# Patient Record
Sex: FEMALE | Race: WHITE
[De-identification: names, ages, dates, MRNs, and addresses within clinical notes are randomized per-mention and may not be internally consistent; named-entity substitution may affect disease eponyms.]

---

## 2019-02-07 ENCOUNTER — HOSPITAL ENCOUNTER (EMERGENCY)
Dept: HOSPITAL 74 - JER | Age: 24
LOS: 1 days | Discharge: HOME | End: 2019-02-08
Payer: COMMERCIAL

## 2019-02-07 VITALS — TEMPERATURE: 98 F | SYSTOLIC BLOOD PRESSURE: 127 MMHG | HEART RATE: 91 BPM | DIASTOLIC BLOOD PRESSURE: 77 MMHG

## 2019-02-07 VITALS — BODY MASS INDEX: 25 KG/M2
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
## 2019-02-07 DIAGNOSIS — S60.442A: Primary | ICD-10-CM

## 2019-02-08 NOTE — PDOC
History of Present Illness





<Heather Hurst - Last Filed: 02/08/19 00:45>





- General


History Source: Patient


Exam Limitations: No Limitations





- History of Present Illness


Initial Comments: 





02/08/19 01:19





The patient is a 23 year old female with no PMH who presents to the ER with a 

ring stuck on the right middle finger. Patient states she usually wears the 

ring on her ring finger but was playing with the ring and got stuck on the 

middle finger. She denies any numbness/tingling/ or pain to the ring finger. 

Patient denies any other symptoms.  











<Agata Robbins - Last Filed: 02/08/19 01:24>





- General


Chief Complaint: Edema


Stated Complaint: RING ON FINGER TO TIGHT


Time Seen by Provider: 02/08/19 00:39





Past History





- Past Medical History


COPD: No





- Suicide/Smoking/Psychosocial Hx


Smoking History: Never smoked


Hx Alcohol Use: Yes (Social)


Drug/Substance Use Hx: Yes (MJ)





<Heather Hurst - Last Filed: 02/08/19 00:45>





**Review of Systems





- Review of Systems


Able to Perform ROS?: Yes


Comments:: 





02/08/19 01:21


ADULT ROS


GENERAL/CONSTITUTIONAL: No fever or chills. No weakness.


HEAD, EYES, EARS, NOSE AND THROAT: No change in vision. No ear pain or 

discharge. No sore throat.


GASTROINTESTINAL: No nausea, vomiting, diarrhea or constipation.


GENITOURINARY: No dysuria, frequency, or change in urination.


CARDIOVASCULAR: No chest pain or shortness of breath.


RESPIRATORY: No cough, wheezing, or hemoptysis.


MUSCULOSKELETAL: No joint or muscle swelling or pain. No neck or back pain.


SKIN:  (+) Ring stuck on R middle finger. 


NEUROLOGIC: No headache, vertigo, loss of consciousness, or change in strength/

sensation.


ENDOCRINE: No increased thirst. No abnormal weight change.


HEMATOLOGIC/LYMPHATIC: No anemia, easy bleeding, or history of blood clots.


ALLERGIC/IMMUNOLOGIC: No hives or skin allergy.








<Agata Robbins - Last Filed: 02/08/19 01:24>





*Physical Exam





- Vital Signs


 Last Vital Signs











Temp Pulse Resp BP Pulse Ox


 


 98 F   91 H  20   127/77   100 


 


 02/07/19 23:53  02/07/19 23:53  02/07/19 23:53  02/07/19 23:53  02/07/19 23:53














<Heather Hurst - Last Filed: 02/08/19 00:45>





- Vital Signs


 Last Vital Signs











Temp Pulse Resp BP Pulse Ox


 


 98 F   91 H  20   127/77   100 


 


 02/07/19 23:53  02/07/19 23:53  02/07/19 23:53  02/07/19 23:53  02/07/19 23:53














- Physical Exam


Comments: 





02/08/19 01:22


ADULT PHYSICAL EXAM


Constitutional: Awake, alert, oriented.  No acute distress.


Cardiovascular:  Regular rate.  Regular rhythm. S1, S2 regular.  Distal pulses 

are 2+ and symmetric.  


Pulmonary/Chest:  No evidence of respiratory distress.  Clear to auscultation 

bilaterally  No wheezing, rales or rhonchi.


Abdominal:  Soft and non-distended.  There is no tenderness.  No rebound, 

guarding or rigidity.  No organomegaly. No palpable masses. Good bowel sounds.


Musculoskeletal:  No edema.  No cyanosis.  No clubbing.  Full range of motion 

in all extremities.  Nocalf tenderness. Radial/pedal pulses are intact and 2+ 

bilaterally


Skin:  Skin is warm and dry.  No petechiae.  No purpura. (+) Right middle 

finger mildly swollen and has a ring indent; sensation intact and has brisk cap 

refill.  


Neurological:  Alert and oriented to person, place, and time.  Cranial nerves II

-XII are grossly intact. Normal speech. Strength is grossly symmetric. No 

sensory deficits.


Psychiatric:  Good eye contact.  Normal interaction, affect and behavior.























<Agata Robbins - Last Filed: 02/08/19 01:24>





Moderate Sedation





- Procedure Monitoring


Vital Signs: 


Procedure Monitoring Vital Signs











Temperature  98 F   02/07/19 23:53


 


Pulse Rate  91 H  02/07/19 23:53


 


Respiratory Rate  20   02/07/19 23:53


 


Blood Pressure  127/77   02/07/19 23:53


 


O2 Sat by Pulse Oximetry (%)  100   02/07/19 23:53











<Heather Hurst - Last Filed: 02/08/19 00:45>





- Procedure Monitoring


Vital Signs: 


Procedure Monitoring Vital Signs











Temperature  98 F   02/07/19 23:53


 


Pulse Rate  91 H  02/07/19 23:53


 


Respiratory Rate  20   02/07/19 23:53


 


Blood Pressure  127/77   02/07/19 23:53


 


O2 Sat by Pulse Oximetry (%)  100   02/07/19 23:53











<Agata Robbins - Last Filed: 02/08/19 01:24>





Medical Decision Making





- Medical Decision Making





02/08/19 00:45


22yo female with a ring stuck on her middle finger of r hand


-ring was cut off by the nurse prior to eval


-was playing with the ring and it got stuck on the middle finger, usually wears 

it on the ring finger


-pt neurovasc intact


-stable for dc to home





<Heather Hurst - Last Filed: 02/08/19 00:45>





*DC/Admit/Observation/Transfer





- Discharge Dispostion


Decision to Admit order: No





- Attestations


Physician Attestion: 





02/08/19 00:49








I, Dr. Heather Hurst DO, attest that this document has been prepared under 

my direction and personally reviewed by me in its entirety.   I further attest, 

that it accurately reflects all work, treatment, procedures and medical decision

-making performed by me.  





<Heather Hurst - Last Filed: 02/08/19 00:45>





- Attestations


Scribe Attestion: 





02/08/19 01:24





Documentation prepared by Agata Robbins, acting as medical scribe for Heather Hurst DO.





<Agata Robbins - Last Filed: 02/08/19 01:24>


Diagnosis at time of Disposition: 


 Swelling of finger, Constrictive jewelry of finger








- Discharge Dispostion


Disposition: HOME


Condition at time of disposition: Stable





- Referrals


Referrals: 


Leonides Nguyen MD [Staff Physician] - 





- Patient Instructions


Additional Instructions: 


Please apply ice as needed to the finger. Please keep it elevated. The ring was 

removed from the finger while you were in the ED. Please do not wear jewelry to 

the hand while the finger is still mildly swollen. Please follow up with your 

PMD as needed. Please return to the ED with any further concerns or complaints.

## 2022-11-21 ENCOUNTER — APPOINTMENT (OUTPATIENT)
Dept: ANTEPARTUM | Facility: CLINIC | Age: 27
End: 2022-11-21

## 2022-11-21 ENCOUNTER — ASOB RESULT (OUTPATIENT)
Age: 27
End: 2022-11-21

## 2022-11-21 PROCEDURE — 76801 OB US < 14 WKS SINGLE FETUS: CPT | Mod: NC

## 2022-11-21 PROCEDURE — 93976 VASCULAR STUDY: CPT

## 2022-11-21 PROCEDURE — 76813 OB US NUCHAL MEAS 1 GEST: CPT

## 2023-01-17 ENCOUNTER — ASOB RESULT (OUTPATIENT)
Age: 28
End: 2023-01-17

## 2023-01-17 ENCOUNTER — APPOINTMENT (OUTPATIENT)
Dept: ANTEPARTUM | Facility: CLINIC | Age: 28
End: 2023-01-17
Payer: MEDICAID

## 2023-01-17 PROCEDURE — 76805 OB US >/= 14 WKS SNGL FETUS: CPT

## 2023-01-31 ENCOUNTER — APPOINTMENT (OUTPATIENT)
Dept: ANTEPARTUM | Facility: CLINIC | Age: 28
End: 2023-01-31

## 2023-02-20 PROCEDURE — PSEU9049 QUANTIFERON TB PLUS: Performed by: CLINICAL MEDICAL LABORATORY

## 2023-02-20 PROCEDURE — 86480 TB TEST CELL IMMUN MEASURE: CPT | Performed by: CLINICAL MEDICAL LABORATORY

## 2023-02-21 ENCOUNTER — LAB REQUISITION (OUTPATIENT)
Dept: LAB | Age: 28
End: 2023-02-21

## 2023-02-21 DIAGNOSIS — Z13.9 ENCOUNTER FOR SCREENING, UNSPECIFIED: ICD-10-CM

## 2023-02-22 LAB
GAMMA INTERFERON BACKGROUND BLD IA-ACNC: 0.03 IU/ML
M TB IFN-G BLD-IMP: NEGATIVE
M TB IFN-G CD4+ BCKGRND COR BLD-ACNC: 0 IU/ML
M TB IFN-G CD4+CD8+ BCKGRND COR BLD-ACNC: 0.01 IU/ML
MITOGEN IGNF BCKGRD COR BLD-ACNC: >10 IU/ML

## 2023-03-24 ENCOUNTER — LAB REQUISITION (OUTPATIENT)
Dept: LAB | Age: 28
End: 2023-03-24

## 2023-03-24 DIAGNOSIS — Z34.00 ENCOUNTER FOR SUPERVISION OF NORMAL FIRST PREGNANCY, UNSPECIFIED TRIMESTER: ICD-10-CM

## 2023-03-24 PROCEDURE — PSEU9964 TRICHOMONAS VAGINALIS BY NUCLEIC ACID AMPLIFICATION: Performed by: CLINICAL MEDICAL LABORATORY

## 2023-03-24 PROCEDURE — 87491 CHLMYD TRACH DNA AMP PROBE: CPT | Performed by: CLINICAL MEDICAL LABORATORY

## 2023-03-24 PROCEDURE — PSEU9913 CHLAMYDIA/GONORRHEA BY NUCLEIC ACID AMPLIFICATION: Performed by: CLINICAL MEDICAL LABORATORY

## 2023-03-24 PROCEDURE — 87661 TRICHOMONAS VAGINALIS AMPLIF: CPT | Performed by: CLINICAL MEDICAL LABORATORY

## 2023-03-24 PROCEDURE — 87591 N.GONORRHOEAE DNA AMP PROB: CPT | Performed by: CLINICAL MEDICAL LABORATORY

## 2023-03-27 LAB
C TRACH RRNA SPEC QL NAA+PROBE: NEGATIVE
Lab: NORMAL
N GONORRHOEA RRNA SPEC QL NAA+PROBE: NEGATIVE
T VAGINALIS RRNA VAG QL NAA+PROBE: NEGATIVE

## 2023-05-16 ENCOUNTER — APPOINTMENT (OUTPATIENT)
Dept: ANTEPARTUM | Facility: CLINIC | Age: 28
End: 2023-05-16
Payer: MEDICAID

## 2023-05-16 ENCOUNTER — ASOB RESULT (OUTPATIENT)
Age: 28
End: 2023-05-16

## 2023-05-16 PROCEDURE — 76818 FETAL BIOPHYS PROFILE W/NST: CPT

## 2023-05-16 PROCEDURE — 76816 OB US FOLLOW-UP PER FETUS: CPT

## 2023-06-03 ENCOUNTER — TRANSCRIPTION ENCOUNTER (OUTPATIENT)
Age: 28
End: 2023-06-03

## 2023-06-04 ENCOUNTER — INPATIENT (INPATIENT)
Facility: HOSPITAL | Age: 28
LOS: 3 days | Discharge: HOME | End: 2023-06-08
Attending: OBSTETRICS & GYNECOLOGY | Admitting: OBSTETRICS & GYNECOLOGY
Payer: MEDICAID

## 2023-06-04 VITALS — WEIGHT: 167.99 LBS | HEIGHT: 62 IN

## 2023-06-04 DIAGNOSIS — O26.899 OTHER SPECIFIED PREGNANCY RELATED CONDITIONS, UNSPECIFIED TRIMESTER: ICD-10-CM

## 2023-06-04 DIAGNOSIS — Z3A.00 WEEKS OF GESTATION OF PREGNANCY NOT SPECIFIED: ICD-10-CM

## 2023-06-04 LAB
BASOPHILS # BLD AUTO: 0.03 K/UL — SIGNIFICANT CHANGE UP (ref 0–0.2)
BASOPHILS NFR BLD AUTO: 0.3 % — SIGNIFICANT CHANGE UP (ref 0–2)
BLD GP AB SCN SERPL QL: NEGATIVE — SIGNIFICANT CHANGE UP
EOSINOPHIL # BLD AUTO: 0.06 K/UL — SIGNIFICANT CHANGE UP (ref 0–0.5)
EOSINOPHIL NFR BLD AUTO: 0.7 % — SIGNIFICANT CHANGE UP (ref 0–6)
HCT VFR BLD CALC: 36.6 % — SIGNIFICANT CHANGE UP (ref 34.5–45)
HGB BLD-MCNC: 12.4 G/DL — SIGNIFICANT CHANGE UP (ref 11.5–15.5)
IMM GRANULOCYTES NFR BLD AUTO: 0.5 % — SIGNIFICANT CHANGE UP (ref 0–0.9)
LYMPHOCYTES # BLD AUTO: 2.1 K/UL — SIGNIFICANT CHANGE UP (ref 1–3.3)
LYMPHOCYTES # BLD AUTO: 24.1 % — SIGNIFICANT CHANGE UP (ref 13–44)
MCHC RBC-ENTMCNC: 30 PG — SIGNIFICANT CHANGE UP (ref 27–34)
MCHC RBC-ENTMCNC: 33.9 GM/DL — SIGNIFICANT CHANGE UP (ref 32–36)
MCV RBC AUTO: 88.6 FL — SIGNIFICANT CHANGE UP (ref 80–100)
MONOCYTES # BLD AUTO: 0.73 K/UL — SIGNIFICANT CHANGE UP (ref 0–0.9)
MONOCYTES NFR BLD AUTO: 8.4 % — SIGNIFICANT CHANGE UP (ref 2–14)
NEUTROPHILS # BLD AUTO: 5.74 K/UL — SIGNIFICANT CHANGE UP (ref 1.8–7.4)
NEUTROPHILS NFR BLD AUTO: 66 % — SIGNIFICANT CHANGE UP (ref 43–77)
NRBC # BLD: 0 /100 WBCS — SIGNIFICANT CHANGE UP (ref 0–0)
PLATELET # BLD AUTO: 275 K/UL — SIGNIFICANT CHANGE UP (ref 150–400)
RBC # BLD: 4.13 M/UL — SIGNIFICANT CHANGE UP (ref 3.8–5.2)
RBC # FLD: 13.2 % — SIGNIFICANT CHANGE UP (ref 10.3–14.5)
RH IG SCN BLD-IMP: POSITIVE — SIGNIFICANT CHANGE UP
RH IG SCN BLD-IMP: POSITIVE — SIGNIFICANT CHANGE UP
WBC # BLD: 8.7 K/UL — SIGNIFICANT CHANGE UP (ref 3.8–10.5)
WBC # FLD AUTO: 8.7 K/UL — SIGNIFICANT CHANGE UP (ref 3.8–10.5)

## 2023-06-04 RX ORDER — CITRIC ACID/SODIUM CITRATE 300-500 MG
15 SOLUTION, ORAL ORAL EVERY 6 HOURS
Refills: 0 | Status: DISCONTINUED | OUTPATIENT
Start: 2023-06-04 | End: 2023-06-05

## 2023-06-04 RX ORDER — OXYTOCIN 10 UNIT/ML
333.33 VIAL (ML) INJECTION
Qty: 20 | Refills: 0 | Status: DISCONTINUED | OUTPATIENT
Start: 2023-06-04 | End: 2023-06-05

## 2023-06-04 RX ORDER — CHLORHEXIDINE GLUCONATE 213 G/1000ML
1 SOLUTION TOPICAL DAILY
Refills: 0 | Status: DISCONTINUED | OUTPATIENT
Start: 2023-06-04 | End: 2023-06-05

## 2023-06-04 RX ORDER — SODIUM CHLORIDE 9 MG/ML
1000 INJECTION, SOLUTION INTRAVENOUS
Refills: 0 | Status: DISCONTINUED | OUTPATIENT
Start: 2023-06-04 | End: 2023-06-05

## 2023-06-04 RX ORDER — OXYTOCIN 10 UNIT/ML
2 VIAL (ML) INJECTION
Qty: 30 | Refills: 0 | Status: DISCONTINUED | OUTPATIENT
Start: 2023-06-04 | End: 2023-06-08

## 2023-06-04 RX ORDER — FENTANYL/BUPIVACAINE/NS/PF 2MCG/ML-.1
250 PLASTIC BAG, INJECTION (ML) INJECTION
Refills: 0 | Status: DISCONTINUED | OUTPATIENT
Start: 2023-06-04 | End: 2023-06-08

## 2023-06-04 RX ADMIN — SODIUM CHLORIDE 125 MILLILITER(S): 9 INJECTION, SOLUTION INTRAVENOUS at 19:18

## 2023-06-04 RX ADMIN — Medication 2 MILLIUNIT(S)/MIN: at 21:54

## 2023-06-05 LAB
COVID-19 SPIKE DOMAIN AB INTERP: POSITIVE
COVID-19 SPIKE DOMAIN ANTIBODY RESULT: >250 U/ML — HIGH
SARS-COV-2 IGG+IGM SERPL QL IA: >250 U/ML — HIGH
SARS-COV-2 IGG+IGM SERPL QL IA: POSITIVE
T PALLIDUM AB TITR SER: NEGATIVE — SIGNIFICANT CHANGE UP

## 2023-06-05 PROCEDURE — 88307 TISSUE EXAM BY PATHOLOGIST: CPT | Mod: 26

## 2023-06-05 DEVICE — SURGICEL FIBRILLAR 2 X 4": Type: IMPLANTABLE DEVICE | Status: FUNCTIONAL

## 2023-06-05 RX ORDER — ENOXAPARIN SODIUM 100 MG/ML
40 INJECTION SUBCUTANEOUS EVERY 24 HOURS
Refills: 0 | Status: DISCONTINUED | OUTPATIENT
Start: 2023-06-06 | End: 2023-06-08

## 2023-06-05 RX ORDER — IBUPROFEN 200 MG
600 TABLET ORAL EVERY 6 HOURS
Refills: 0 | Status: COMPLETED | OUTPATIENT
Start: 2023-06-05 | End: 2024-05-03

## 2023-06-05 RX ORDER — OXYCODONE HYDROCHLORIDE 5 MG/1
5 TABLET ORAL
Refills: 0 | Status: DISCONTINUED | OUTPATIENT
Start: 2023-06-05 | End: 2023-06-08

## 2023-06-05 RX ORDER — KETOROLAC TROMETHAMINE 30 MG/ML
30 SYRINGE (ML) INJECTION ONCE
Refills: 0 | Status: DISCONTINUED | OUTPATIENT
Start: 2023-06-05 | End: 2023-06-05

## 2023-06-05 RX ORDER — ACETAMINOPHEN 500 MG
1000 TABLET ORAL ONCE
Refills: 0 | Status: COMPLETED | OUTPATIENT
Start: 2023-06-05 | End: 2023-06-05

## 2023-06-05 RX ORDER — SIMETHICONE 80 MG/1
80 TABLET, CHEWABLE ORAL EVERY 4 HOURS
Refills: 0 | Status: DISCONTINUED | OUTPATIENT
Start: 2023-06-05 | End: 2023-06-08

## 2023-06-05 RX ORDER — AZITHROMYCIN 500 MG/1
500 TABLET, FILM COATED ORAL ONCE
Refills: 0 | Status: COMPLETED | OUTPATIENT
Start: 2023-06-05 | End: 2023-06-05

## 2023-06-05 RX ORDER — SODIUM CHLORIDE 9 MG/ML
1000 INJECTION, SOLUTION INTRAVENOUS
Refills: 0 | Status: DISCONTINUED | OUTPATIENT
Start: 2023-06-05 | End: 2023-06-08

## 2023-06-05 RX ORDER — DIPHENHYDRAMINE HCL 50 MG
25 CAPSULE ORAL EVERY 6 HOURS
Refills: 0 | Status: COMPLETED | OUTPATIENT
Start: 2023-06-05 | End: 2024-05-03

## 2023-06-05 RX ORDER — TETANUS TOXOID, REDUCED DIPHTHERIA TOXOID AND ACELLULAR PERTUSSIS VACCINE, ADSORBED 5; 2.5; 8; 8; 2.5 [IU]/.5ML; [IU]/.5ML; UG/.5ML; UG/.5ML; UG/.5ML
0.5 SUSPENSION INTRAMUSCULAR ONCE
Refills: 0 | Status: DISCONTINUED | OUTPATIENT
Start: 2023-06-05 | End: 2023-06-08

## 2023-06-05 RX ORDER — ACETAMINOPHEN 500 MG
975 TABLET ORAL
Refills: 0 | Status: DISCONTINUED | OUTPATIENT
Start: 2023-06-05 | End: 2023-06-08

## 2023-06-05 RX ORDER — LANOLIN
1 OINTMENT (GRAM) TOPICAL EVERY 6 HOURS
Refills: 0 | Status: DISCONTINUED | OUTPATIENT
Start: 2023-06-05 | End: 2023-06-08

## 2023-06-05 RX ORDER — MAGNESIUM HYDROXIDE 400 MG/1
30 TABLET, CHEWABLE ORAL
Refills: 0 | Status: DISCONTINUED | OUTPATIENT
Start: 2023-06-05 | End: 2023-06-08

## 2023-06-05 RX ORDER — OXYCODONE HYDROCHLORIDE 5 MG/1
5 TABLET ORAL
Refills: 0 | Status: COMPLETED | OUTPATIENT
Start: 2023-06-05 | End: 2023-06-12

## 2023-06-05 RX ORDER — ONDANSETRON 8 MG/1
8 TABLET, FILM COATED ORAL EVERY 6 HOURS
Refills: 0 | Status: DISCONTINUED | OUTPATIENT
Start: 2023-06-05 | End: 2023-06-08

## 2023-06-05 RX ORDER — IBUPROFEN 200 MG
600 TABLET ORAL EVERY 6 HOURS
Refills: 0 | Status: DISCONTINUED | OUTPATIENT
Start: 2023-06-05 | End: 2023-06-08

## 2023-06-05 RX ORDER — OXYCODONE HYDROCHLORIDE 5 MG/1
5 TABLET ORAL ONCE
Refills: 0 | Status: DISCONTINUED | OUTPATIENT
Start: 2023-06-05 | End: 2023-06-08

## 2023-06-05 RX ORDER — OXYTOCIN 10 UNIT/ML
333.33 VIAL (ML) INJECTION
Qty: 20 | Refills: 0 | Status: DISCONTINUED | OUTPATIENT
Start: 2023-06-05 | End: 2023-06-08

## 2023-06-05 RX ORDER — CEFAZOLIN SODIUM 1 G
2000 VIAL (EA) INJECTION ONCE
Refills: 0 | Status: COMPLETED | OUTPATIENT
Start: 2023-06-05 | End: 2023-06-05

## 2023-06-05 RX ADMIN — Medication 100 MILLIGRAM(S): at 12:55

## 2023-06-05 RX ADMIN — Medication 975 MILLIGRAM(S): at 22:47

## 2023-06-05 RX ADMIN — AZITHROMYCIN 255 MILLIGRAM(S): 500 TABLET, FILM COATED ORAL at 13:10

## 2023-06-05 RX ADMIN — SODIUM CHLORIDE 125 MILLILITER(S): 9 INJECTION, SOLUTION INTRAVENOUS at 10:00

## 2023-06-05 RX ADMIN — Medication 400 MILLIGRAM(S): at 15:54

## 2023-06-05 RX ADMIN — Medication 975 MILLIGRAM(S): at 21:29

## 2023-06-05 RX ADMIN — Medication 600 MILLIGRAM(S): at 23:59

## 2023-06-05 RX ADMIN — Medication 30 MILLIGRAM(S): at 19:05

## 2023-06-05 RX ADMIN — OXYCODONE HYDROCHLORIDE 5 MILLIGRAM(S): 5 TABLET ORAL at 18:00

## 2023-06-05 RX ADMIN — Medication 30 MILLIGRAM(S): at 18:07

## 2023-06-05 RX ADMIN — OXYCODONE HYDROCHLORIDE 5 MILLIGRAM(S): 5 TABLET ORAL at 17:06

## 2023-06-06 ENCOUNTER — APPOINTMENT (OUTPATIENT)
Dept: ANTEPARTUM | Facility: CLINIC | Age: 28
End: 2023-06-06

## 2023-06-06 LAB
ANISOCYTOSIS BLD QL: SLIGHT — SIGNIFICANT CHANGE UP
BASOPHILS # BLD AUTO: 0.19 K/UL — SIGNIFICANT CHANGE UP (ref 0–0.2)
BASOPHILS NFR BLD AUTO: 0.9 % — SIGNIFICANT CHANGE UP (ref 0–2)
EOSINOPHIL # BLD AUTO: 0 K/UL — SIGNIFICANT CHANGE UP (ref 0–0.5)
EOSINOPHIL NFR BLD AUTO: 0 % — SIGNIFICANT CHANGE UP (ref 0–6)
GIANT PLATELETS BLD QL SMEAR: PRESENT — SIGNIFICANT CHANGE UP
HCT VFR BLD CALC: 27.9 % — LOW (ref 34.5–45)
HGB BLD-MCNC: 9.2 G/DL — LOW (ref 11.5–15.5)
LYMPHOCYTES # BLD AUTO: 2.12 K/UL — SIGNIFICANT CHANGE UP (ref 1–3.3)
LYMPHOCYTES # BLD AUTO: 9.9 % — LOW (ref 13–44)
MACROCYTES BLD QL: SLIGHT — SIGNIFICANT CHANGE UP
MANUAL SMEAR VERIFICATION: SIGNIFICANT CHANGE UP
MCHC RBC-ENTMCNC: 30 PG — SIGNIFICANT CHANGE UP (ref 27–34)
MCHC RBC-ENTMCNC: 33 GM/DL — SIGNIFICANT CHANGE UP (ref 32–36)
MCV RBC AUTO: 90.9 FL — SIGNIFICANT CHANGE UP (ref 80–100)
MONOCYTES # BLD AUTO: 0.58 K/UL — SIGNIFICANT CHANGE UP (ref 0–0.9)
MONOCYTES NFR BLD AUTO: 2.7 % — SIGNIFICANT CHANGE UP (ref 2–14)
NEUTROPHILS # BLD AUTO: 18.12 K/UL — HIGH (ref 1.8–7.4)
NEUTROPHILS NFR BLD AUTO: 83.8 % — HIGH (ref 43–77)
NEUTS BAND # BLD: 0.9 % — SIGNIFICANT CHANGE UP (ref 0–8)
OVALOCYTES BLD QL SMEAR: SLIGHT — SIGNIFICANT CHANGE UP
PLAT MORPH BLD: ABNORMAL
PLATELET # BLD AUTO: 200 K/UL — SIGNIFICANT CHANGE UP (ref 150–400)
POLYCHROMASIA BLD QL SMEAR: SLIGHT — SIGNIFICANT CHANGE UP
RBC # BLD: 3.07 M/UL — LOW (ref 3.8–5.2)
RBC # FLD: 13.4 % — SIGNIFICANT CHANGE UP (ref 10.3–14.5)
RBC BLD AUTO: ABNORMAL
SMUDGE CELLS # BLD: PRESENT — SIGNIFICANT CHANGE UP
VARIANT LYMPHS # BLD: 1.8 % — SIGNIFICANT CHANGE UP (ref 0–6)
WBC # BLD: 21.39 K/UL — HIGH (ref 3.8–10.5)
WBC # FLD AUTO: 21.39 K/UL — HIGH (ref 3.8–10.5)

## 2023-06-06 RX ORDER — DIPHENHYDRAMINE HCL 50 MG
25 CAPSULE ORAL EVERY 6 HOURS
Refills: 0 | Status: DISCONTINUED | OUTPATIENT
Start: 2023-06-06 | End: 2023-06-08

## 2023-06-06 RX ADMIN — MAGNESIUM HYDROXIDE 30 MILLILITER(S): 400 TABLET, CHEWABLE ORAL at 22:26

## 2023-06-06 RX ADMIN — Medication 975 MILLIGRAM(S): at 09:24

## 2023-06-06 RX ADMIN — OXYCODONE HYDROCHLORIDE 5 MILLIGRAM(S): 5 TABLET ORAL at 22:08

## 2023-06-06 RX ADMIN — OXYCODONE HYDROCHLORIDE 5 MILLIGRAM(S): 5 TABLET ORAL at 11:16

## 2023-06-06 RX ADMIN — Medication 1 APPLICATION(S): at 12:45

## 2023-06-06 RX ADMIN — Medication 975 MILLIGRAM(S): at 15:39

## 2023-06-06 RX ADMIN — ENOXAPARIN SODIUM 40 MILLIGRAM(S): 100 INJECTION SUBCUTANEOUS at 06:04

## 2023-06-06 RX ADMIN — Medication 600 MILLIGRAM(S): at 23:39

## 2023-06-06 RX ADMIN — Medication 25 MILLIGRAM(S): at 03:13

## 2023-06-06 RX ADMIN — SIMETHICONE 80 MILLIGRAM(S): 80 TABLET, CHEWABLE ORAL at 22:26

## 2023-06-06 RX ADMIN — OXYCODONE HYDROCHLORIDE 5 MILLIGRAM(S): 5 TABLET ORAL at 12:16

## 2023-06-06 RX ADMIN — OXYCODONE HYDROCHLORIDE 5 MILLIGRAM(S): 5 TABLET ORAL at 21:10

## 2023-06-06 RX ADMIN — Medication 975 MILLIGRAM(S): at 22:08

## 2023-06-06 RX ADMIN — Medication 975 MILLIGRAM(S): at 10:20

## 2023-06-06 RX ADMIN — OXYCODONE HYDROCHLORIDE 5 MILLIGRAM(S): 5 TABLET ORAL at 18:39

## 2023-06-06 RX ADMIN — Medication 600 MILLIGRAM(S): at 12:46

## 2023-06-06 RX ADMIN — Medication 600 MILLIGRAM(S): at 06:04

## 2023-06-06 RX ADMIN — Medication 975 MILLIGRAM(S): at 02:56

## 2023-06-06 RX ADMIN — OXYCODONE HYDROCHLORIDE 5 MILLIGRAM(S): 5 TABLET ORAL at 19:30

## 2023-06-06 RX ADMIN — Medication 975 MILLIGRAM(S): at 21:08

## 2023-06-06 NOTE — LACTATION INITIAL EVALUATION - NS LACT CON REASON FOR REQ
Dyad seen at 11hrs post birth. Demonstrate hand expressing colostrum, bilateral expressible colostrum present. Place infant on Rt breast with football hold, infant latched with widely open flanged mouth and sucking sustained without nipple pain. Discuss how to maintain and increase breast milk supply and benefits of exclusively breastfeeding for mom and infant. Mom comfortable with latching and positioning. Discuss consult with primary RN/primaparous mom

## 2023-06-07 RX ORDER — KETOROLAC TROMETHAMINE 30 MG/ML
30 SYRINGE (ML) INJECTION EVERY 6 HOURS
Refills: 0 | Status: DISCONTINUED | OUTPATIENT
Start: 2023-06-07 | End: 2023-06-08

## 2023-06-07 RX ADMIN — Medication 975 MILLIGRAM(S): at 08:40

## 2023-06-07 RX ADMIN — OXYCODONE HYDROCHLORIDE 5 MILLIGRAM(S): 5 TABLET ORAL at 08:00

## 2023-06-07 RX ADMIN — OXYCODONE HYDROCHLORIDE 5 MILLIGRAM(S): 5 TABLET ORAL at 11:03

## 2023-06-07 RX ADMIN — ENOXAPARIN SODIUM 40 MILLIGRAM(S): 100 INJECTION SUBCUTANEOUS at 06:49

## 2023-06-07 RX ADMIN — OXYCODONE HYDROCHLORIDE 5 MILLIGRAM(S): 5 TABLET ORAL at 07:04

## 2023-06-07 RX ADMIN — Medication 975 MILLIGRAM(S): at 15:14

## 2023-06-07 RX ADMIN — Medication 975 MILLIGRAM(S): at 16:30

## 2023-06-07 RX ADMIN — Medication 975 MILLIGRAM(S): at 03:19

## 2023-06-07 RX ADMIN — Medication 30 MILLIGRAM(S): at 12:30

## 2023-06-07 RX ADMIN — Medication 975 MILLIGRAM(S): at 04:00

## 2023-06-07 RX ADMIN — Medication 600 MILLIGRAM(S): at 00:40

## 2023-06-07 RX ADMIN — OXYCODONE HYDROCHLORIDE 5 MILLIGRAM(S): 5 TABLET ORAL at 00:38

## 2023-06-07 RX ADMIN — Medication 30 MILLIGRAM(S): at 19:00

## 2023-06-07 RX ADMIN — Medication 30 MILLIGRAM(S): at 18:13

## 2023-06-07 RX ADMIN — Medication 1 APPLICATION(S): at 07:05

## 2023-06-07 RX ADMIN — Medication 600 MILLIGRAM(S): at 07:26

## 2023-06-07 RX ADMIN — Medication 975 MILLIGRAM(S): at 22:00

## 2023-06-07 RX ADMIN — OXYCODONE HYDROCHLORIDE 5 MILLIGRAM(S): 5 TABLET ORAL at 01:00

## 2023-06-07 RX ADMIN — Medication 30 MILLIGRAM(S): at 11:36

## 2023-06-07 RX ADMIN — Medication 975 MILLIGRAM(S): at 21:06

## 2023-06-07 RX ADMIN — OXYCODONE HYDROCHLORIDE 5 MILLIGRAM(S): 5 TABLET ORAL at 10:13

## 2023-06-07 RX ADMIN — Medication 600 MILLIGRAM(S): at 06:48

## 2023-06-07 RX ADMIN — Medication 975 MILLIGRAM(S): at 09:30

## 2023-06-08 ENCOUNTER — TRANSCRIPTION ENCOUNTER (OUTPATIENT)
Age: 28
End: 2023-06-08

## 2023-06-08 VITALS
OXYGEN SATURATION: 97 % | DIASTOLIC BLOOD PRESSURE: 80 MMHG | HEART RATE: 90 BPM | SYSTOLIC BLOOD PRESSURE: 123 MMHG | TEMPERATURE: 99 F

## 2023-06-08 PROCEDURE — 86900 BLOOD TYPING SEROLOGIC ABO: CPT

## 2023-06-08 PROCEDURE — 86769 SARS-COV-2 COVID-19 ANTIBODY: CPT

## 2023-06-08 PROCEDURE — 36415 COLL VENOUS BLD VENIPUNCTURE: CPT

## 2023-06-08 PROCEDURE — C1889: CPT

## 2023-06-08 PROCEDURE — 99214 OFFICE O/P EST MOD 30 MIN: CPT

## 2023-06-08 PROCEDURE — 86780 TREPONEMA PALLIDUM: CPT

## 2023-06-08 PROCEDURE — 86901 BLOOD TYPING SEROLOGIC RH(D): CPT

## 2023-06-08 PROCEDURE — 85025 COMPLETE CBC W/AUTO DIFF WBC: CPT

## 2023-06-08 PROCEDURE — 88307 TISSUE EXAM BY PATHOLOGIST: CPT

## 2023-06-08 PROCEDURE — 59050 FETAL MONITOR W/REPORT: CPT

## 2023-06-08 PROCEDURE — 86850 RBC ANTIBODY SCREEN: CPT

## 2023-06-08 RX ORDER — IBUPROFEN 200 MG
1 TABLET ORAL
Qty: 0 | Refills: 0 | DISCHARGE
Start: 2023-06-08

## 2023-06-08 RX ORDER — ACETAMINOPHEN 500 MG
3 TABLET ORAL
Qty: 0 | Refills: 0 | DISCHARGE
Start: 2023-06-08

## 2023-06-08 RX ADMIN — Medication 600 MILLIGRAM(S): at 12:07

## 2023-06-08 RX ADMIN — Medication 600 MILLIGRAM(S): at 06:22

## 2023-06-08 RX ADMIN — Medication 975 MILLIGRAM(S): at 08:35

## 2023-06-08 RX ADMIN — Medication 975 MILLIGRAM(S): at 08:04

## 2023-06-08 RX ADMIN — Medication 975 MILLIGRAM(S): at 03:05

## 2023-06-08 RX ADMIN — Medication 30 MILLIGRAM(S): at 00:16

## 2023-06-08 RX ADMIN — SIMETHICONE 80 MILLIGRAM(S): 80 TABLET, CHEWABLE ORAL at 12:26

## 2023-06-08 RX ADMIN — ENOXAPARIN SODIUM 40 MILLIGRAM(S): 100 INJECTION SUBCUTANEOUS at 06:22

## 2023-06-08 RX ADMIN — Medication 600 MILLIGRAM(S): at 07:10

## 2023-06-08 RX ADMIN — Medication 30 MILLIGRAM(S): at 01:55

## 2023-06-08 RX ADMIN — Medication 600 MILLIGRAM(S): at 12:40

## 2023-06-08 RX ADMIN — Medication 975 MILLIGRAM(S): at 04:00

## 2023-06-08 NOTE — DISCHARGE NOTE OB - DISCHARGE DATE
08-Jun-2023 Spine appears normal, no C/T/L spine tenderness or ecchymosis noted, +ttp B anterior knees (R>L) with mild swelling to anterior right knee, skin intact, no erythema noted, FROM B knees but painful with flexion, toes warm & mobile, no foot drop, distal pulses and sensation intact, NVI, B hips/ankle/foot NT with FROM, BUE NT with FROM

## 2023-06-08 NOTE — PROGRESS NOTE ADULT - ASSESSMENT
28y Female POD#3    s/p C/S, Uncomplicated                                       - Neuro/Pain:  toradol atc, tylenol atc, oxy prn  - CV:  VS per routine  - Pulm: Encourage ISS & Ambulation  - GI: Advance as tolerated  - : Voiding spontaneously  - DVT ppx: SCDs, Lovenox 40mg QD  - Dispo: POD #3/4
A/P   28y  s/p  section, POD #1, stable  -  Pain: PO Tylenol, IV Toradol for 24 hours, then PO motrin, Oxycode for severe pain PRN  -  Post-operatively labs: post-op Hgb , hemodynamically stable, no symptoms of anemia   -  GI: tolerating PO, not yet passing gas, ADAT  -  : hayes in situ; DC this AM, f/u TOV  -  DVT prophylaxis: ambulation, SCDs, SQL  -  Dispo: POD 3 or 4
A/P: 28y s/p  section, POD#2, stable  -  Pain: PO motrin q6hrs, tylenol q8hrs, oxycodone for severe pain PRN  -  Post-operatively labs: post-op Hgb , hemodynamically stable, no symptoms of anemia   -  GI: tolerating regular diet, passing gas, simethicone PRN  -  : s/p hayes , urinating without difficulty  -  DVT prophylaxis: encouraged increased ambulation, SCDs, SQL  -  Dispo: POD 3 or 4

## 2023-06-08 NOTE — DISCHARGE NOTE OB - HOSPITAL COURSE
Admitted for induction of labor. Complicated by NRFHT, delivered via primary  section.  Uncomplicated surgery and postoperative course.  Acute blood loss anemia noted on post-operative CBC.  Patient stable with normal vital signs.  No intervention necessary.

## 2023-06-08 NOTE — DISCHARGE NOTE OB - NS MD DC FALL RISK RISK
For information on Fall & Injury Prevention, visit: [email or web address] OR  [email or web address] OR  https://www.cdc.gov/steadi/patient.html

## 2023-06-08 NOTE — PROGRESS NOTE ADULT - SUBJECTIVE AND OBJECTIVE BOX
Patient evaluated at bedside this morning, resting comfortable in bed, with no acute events overnight.  She reports pain is well controlled with Tylenol and Toradol  She denies headache, dizziness, chest pain, palpitations, shortness of breathe, nausea, vomiting or heavy vaginal bleeding.  She has not tried ambulating since procedure, hayes remains in place at this time. Tolerating PO.     Physical Exam:  Vital Signs Last 24 Hrs  T(C): 36.7 (06 Jun 2023 05:24), Max: 37.1 (05 Jun 2023 16:35)  T(F): 98.1 (06 Jun 2023 05:24), Max: 98.8 (05 Jun 2023 16:35)  HR: 77 (06 Jun 2023 05:24) (67 - 110)  BP: 102/60 (06 Jun 2023 05:24) (102/60 - 134/63)  BP(mean): 90 (05 Jun 2023 15:30) (80 - 90)  RR: 18 (06 Jun 2023 05:24) (18 - 19)  SpO2: 97% (06 Jun 2023 05:24) (95% - 99%)    Parameters below as of 05 Jun 2023 18:33  Patient On (Oxygen Delivery Method): room air        GA: NAD, A+0 x 3  Pulm: comfortable on RA  Abd: + BS, soft, nontender, nondistended, no rebound or guarding, incision clean, dry and intact, uterus firm at midline, 2 fb below umbilicus  : hayes in situ, lochia WNL  Extremities: no swelling or calf tenderness, reflexes +2 bilaterally, SCD in place                            12.4   8.70  )-----------( 275      ( 04 Jun 2023 19:18 )             36.6               acetaminophen     Tablet .. 975 milliGRAM(s) Oral <User Schedule>  diphenhydrAMINE 25 milliGRAM(s) Oral every 6 hours PRN  diphtheria/tetanus/pertussis (acellular) Vaccine (Adacel) 0.5 milliLiter(s) IntraMuscular once  enoxaparin Injectable 40 milliGRAM(s) SubCutaneous every 24 hours  fentanyl (2 MICROgram(s)/mL) + bupivacaine 0.0625%  in 0.9% Sodium Chloride PCEA 250 milliLiter(s) Epidural PCA Continuous  ibuprofen  Tablet. 600 milliGRAM(s) Oral every 6 hours  lactated ringers. 1000 milliLiter(s) IV Continuous <Continuous>  lanolin Ointment 1 Application(s) Topical every 6 hours PRN  magnesium hydroxide Suspension 30 milliLiter(s) Oral two times a day PRN  ondansetron Injectable 8 milliGRAM(s) IV Push every 6 hours PRN  oxyCODONE    IR 5 milliGRAM(s) Oral once PRN  oxyCODONE    IR 5 milliGRAM(s) Oral every 3 hours PRN  oxytocin Infusion 333.333 milliUNIT(s)/Min IV Continuous <Continuous>  oxytocin Infusion. 2 milliUNIT(s)/Min IV Continuous <Continuous>  simethicone 80 milliGRAM(s) Chew every 4 hours PRN  
Patient evaluated at bedside this morning, resting comfortable in bed.   She reports pain is well controlled with OPM  She denies headache, dizziness, chest pain, palpitations, shortness of breathe, nausea, vomiting or heavy vaginal bleeding.  She has been ambulating without assistance, voiding spontaneously, passing gas, tolerating regular diet.    Physical Exam:  Vital Signs Last 24 Hrs  T(C): 36.9 (07 Jun 2023 05:34), Max: 36.9 (07 Jun 2023 05:34)  T(F): 98.4 (07 Jun 2023 05:34), Max: 98.4 (07 Jun 2023 05:34)  HR: 94 (07 Jun 2023 05:34) (76 - 94)  BP: 125/77 (07 Jun 2023 05:34) (103/69 - 125/77)  BP(mean): --  RR: 16 (07 Jun 2023 05:34) (16 - 18)  SpO2: 98% (07 Jun 2023 05:34) (97% - 99%)    Parameters below as of 06 Jun 2023 17:50  Patient On (Oxygen Delivery Method): room air        GA: NAD, A+0 x 3  Pulm: comfotable on RA  Abd: + BS, soft, nontender, nondistended, no rebound or guarding, incision clean, dry and intact, uterus firm at midline,  2 fb below umbilicus  : lochia WNL  Extremities: no swelling or calf tenderness                             9.2    21.39 )-----------( 200      ( 06 Jun 2023 05:30 )             27.9               
Patient evaluated at bedside.   She reports pain is well controlled with OPM.  She has been ambulating without assistance, voiding spontaneously, passing gas, tolerating regular diet and is breastfeeding. She would like additional help with latching from the lactation consultant prior to discharge.    She denies HA, dizziness, CP, palpitations, SOB, n/v, or heavy vaginal bleeding.    Physical Exam:  Vital Signs Last 24 Hrs  T(C): 36.8 (07 Jun 2023 22:00), Max: 36.9 (07 Jun 2023 05:34)  T(F): 98.3 (07 Jun 2023 22:00), Max: 98.5 (07 Jun 2023 14:00)  HR: 84 (07 Jun 2023 22:00) (83 - 94)  BP: 117/74 (07 Jun 2023 22:00) (111/73 - 125/77)  BP(mean): --  RR: 17 (07 Jun 2023 22:00) (16 - 18)  SpO2: 97% (07 Jun 2023 22:00) (97% - 98%)    Parameters below as of 07 Jun 2023 22:00  Patient On (Oxygen Delivery Method): room air        Gen: NAD  Abd: soft, nontender, nondistended, no rebound or guarding  Incision clean, dry and intact  uterus firm at midline  : lochia WNL  Extremities: no swelling or calf tenderness                          9.2    21.39 )-----------( 200      ( 06 Jun 2023 05:30 )             27.9

## 2023-06-08 NOTE — DISCHARGE NOTE OB - PATIENT PORTAL LINK FT
You can access the FollowMyHealth Patient Portal offered by [facility] by registering at the following website: [email or web address]. By joining [employer]’s FollowMyHealth portal, you will also be able to view your health information using other applications (apps) compatible with our system.

## 2023-06-08 NOTE — DISCHARGE NOTE OB - PLAN OF CARE
delivery, meeting all postoperative milestones.  Please follow-up with your OB doctor within 1-2 weeks.  You can resume a regular diet at home and may continue your prenatal vitamins as directed.  Please place nothing in the vagina for 6 weeks (no tampons, sex, douching, tub baths, swimming pools, etc).  If you have severe headaches and/or vision changes, heavy bleeding, or chest pain, please call your provider or go to the nearest Emergency Department.  Please call your OB with any signs of symptoms of infection including fever > 100.4 degrees, severe pain, malodorous vaginal discharge or heavy bleeding requiring more than 1-2 pads/hour.  You can take Motrin 600mg orally every 6 hours and Tylenol 1000mg orally every 6 hours for pain as needed.

## 2023-06-08 NOTE — DISCHARGE NOTE OB - CARE PLAN
1 Principal Discharge DX:	 delivery delivered  Assessment and plan of treatment:	 delivery, meeting all postoperative milestones.  Please follow-up with your OB doctor within 1-2 weeks.  You can resume a regular diet at home and may continue your prenatal vitamins as directed.  Please place nothing in the vagina for 6 weeks (no tampons, sex, douching, tub baths, swimming pools, etc).  If you have severe headaches and/or vision changes, heavy bleeding, or chest pain, please call your provider or go to the nearest Emergency Department.  Please call your OB with any signs of symptoms of infection including fever > 100.4 degrees, severe pain, malodorous vaginal discharge or heavy bleeding requiring more than 1-2 pads/hour.  You can take Motrin 600mg orally every 6 hours and Tylenol 1000mg orally every 6 hours for pain as needed.  Secondary Diagnosis:	Acute postoperative anemia due to expected blood loss

## 2023-06-08 NOTE — DISCHARGE NOTE OB - MEDICATION SUMMARY - MEDICATIONS TO STOP TAKING
Awake/Alert/Cooperative
I will STOP taking the medications listed below when I get home from the hospital:  None

## 2023-06-08 NOTE — DISCHARGE NOTE OB - CARE PROVIDER_API CALL
Donnie Lopez  Obstetrics and Gynecology  [address] [place] [address]  Phone: [phone]  Fax: [phone]  Follow Up Time: 2 weeks

## 2023-06-09 PROBLEM — Z00.00 ENCOUNTER FOR PREVENTIVE HEALTH EXAMINATION: Status: ACTIVE | Noted: 2023-06-09

## 2023-06-12 DIAGNOSIS — O48.0 POST-TERM PREGNANCY: ICD-10-CM

## 2023-06-12 DIAGNOSIS — Z28.09 IMMUNIZATION NOT CARRIED OUT BECAUSE OF OTHER CONTRAINDICATION: ICD-10-CM

## 2023-06-12 DIAGNOSIS — Z3A.40 40 WEEKS GESTATION OF PREGNANCY: ICD-10-CM

## 2023-06-12 DIAGNOSIS — O61.0 FAILED MEDICAL INDUCTION OF LABOR: ICD-10-CM

## 2023-06-12 DIAGNOSIS — D62 ACUTE POSTHEMORRHAGIC ANEMIA: ICD-10-CM

## 2023-06-12 DIAGNOSIS — O32.4XX0 MATERNAL CARE FOR HIGH HEAD AT TERM, NOT APPLICABLE OR UNSPECIFIED: ICD-10-CM

## 2023-06-22 LAB — SURGICAL PATHOLOGY STUDY: SIGNIFICANT CHANGE UP

## 2023-06-27 ENCOUNTER — APPOINTMENT (OUTPATIENT)
Age: 28
End: 2023-06-27
Payer: MEDICAID

## 2023-06-27 PROCEDURE — S9445: CPT | Mod: GT

## 2023-06-27 PROCEDURE — S9443: CPT | Mod: 95

## 2024-05-07 NOTE — PATIENT PROFILE OB - BMI (KG/M2)
PCP placed an Open Access Colonoscopy order however this is a recall   Please follow your normal recall process.    Thanks     30.7

## 2025-02-05 NOTE — ANESTHESIA FOLLOW-UP NOTE - NSRECOMMEND_GEN_ALL_CORE
Problem: Adult Inpatient Plan of Care  Goal: Plan of Care Review  Outcome: Not Progressing  Goal: Patient-Specific Goal (Individualized)  Outcome: Not Progressing  Goal: Absence of Hospital-Acquired Illness or Injury  Outcome: Not Progressing  Goal: Optimal Comfort and Wellbeing  Outcome: Not Progressing  Goal: Readiness for Transition of Care  Outcome: Not Progressing     Problem: Diabetes Comorbidity  Goal: Blood Glucose Level Within Targeted Range  Outcome: Not Progressing      None

## 2025-04-23 NOTE — PATIENT PROFILE OB - AS SC BRADEN MOBILITY
(4) no limitation [FreeTextEntry1] : [facility] [place]. [phone] [address] [place] [address]